# Patient Record
Sex: MALE | Race: WHITE | HISPANIC OR LATINO | Employment: FULL TIME | URBAN - METROPOLITAN AREA
[De-identification: names, ages, dates, MRNs, and addresses within clinical notes are randomized per-mention and may not be internally consistent; named-entity substitution may affect disease eponyms.]

---

## 2018-05-29 ENCOUNTER — ANESTHESIA - HEALTHEAST (OUTPATIENT)
Dept: SURGERY | Facility: CLINIC | Age: 43
End: 2018-05-29

## 2018-05-30 ENCOUNTER — SURGERY - HEALTHEAST (OUTPATIENT)
Dept: SURGERY | Facility: CLINIC | Age: 43
End: 2018-05-30

## 2018-05-30 ASSESSMENT — MIFFLIN-ST. JEOR: SCORE: 1931.35

## 2021-06-01 VITALS — WEIGHT: 193.2 LBS | HEIGHT: 78 IN | BODY MASS INDEX: 22.35 KG/M2

## 2021-06-18 NOTE — ANESTHESIA PREPROCEDURE EVALUATION
Anesthesia Evaluation      Patient summary reviewed   No history of anesthetic complications     Airway   Mallampati: II  Neck ROM: full   Pulmonary - normal exam   (+) a smoker                         Cardiovascular - negative ROS and normal exam   Neuro/Psych    (+) depression, anxiety/panic attacks,     Comments: EtoH, marijuana, tobacco use    Endo/Other - negative ROS      GI/Hepatic/Renal - negative ROS      Other findings: Last drink last evening      Dental - normal exam                        Anesthesia Plan  Planned anesthetic: general endotracheal  - 2-4mg versed  - likely will require higher doses of anesthesia than usual  - glidescope available  ASA 3   Induction: intravenous   Anesthetic plan and risks discussed with: patient  Anesthesia plan special considerations: dexmedetomidine  Post-op plan: routine recovery

## 2021-06-18 NOTE — ANESTHESIA CARE TRANSFER NOTE
Last vitals:   Vitals:    05/30/18 0902   BP: (!) 153/92   Pulse: 68   Resp: 14   Temp: 36.6  C (97.8  F)   SpO2: 100%     Patient's level of consciousness is drowsy  Spontaneous respirations: yes  Maintains airway independently: yes  Dentition unchanged: yes  Oropharynx: oropharynx clear of all foreign objects    QCDR Measures:  ASA# 20 - Surgical Safety Checklist: WHO surgical safety checklist completed prior to induction  PQRS# 430 - Adult PONV Prevention: 4558F - Pt received => 2 anti-emetic agents (different classes) preop & intraop  ASA# 8 - Peds PONV Prevention: NA - Not pediatric patient, not GA or 2 or more risk factors NOT present  PQRS# 424 - Raven-op Temp Management: 4559F - At least one body temp DOCUMENTED => 35.5C or 95.9F within required timeframe  PQRS# 426 - PACU Transfer Protocol: - Transfer of care checklist used  ASA# 14 - Acute Post-op Pain: ASA14B - Patient did NOT experience pain >= 7 out of 10

## 2021-06-18 NOTE — ANESTHESIA POSTPROCEDURE EVALUATION
"Patient: Mihir Palomares  LOWER LIP SUTURE REMOVAL BILATERAL SEPTOPLASTY, BILATERAL SUBMUCOSAL RESECTION OF TURBINATES, BILATERAL CAUTERY OF TURBINATES,,  COLSED REDUCTION OF NASAL FRACTURE  Anesthesia type: general    Patient location: Phase II Recovery  Last vitals:   Vitals:    05/30/18 1300   BP: (!) 169/92   Pulse: 61   Resp:    Temp: 36.6  C (97.8  F)   SpO2: 98%     Post vital signs: stable  Level of consciousness: awake and responds to simple questions  Post-anesthesia pain: pain controlled  Post-anesthesia nausea and vomiting: no  Pulmonary: unassisted  Cardiovascular: stable  Hydration: adequate  Anesthetic events: no    QCDR Measures:  ASA# 11 - Raven-op Cardiac Arrest: ASA11B - Patient did NOT experience unanticipated cardiac arrest  ASA# 12 - Raven-op Mortality Rate: ASA12B - Patient did NOT die  ASA# 13 - PACU Re-Intubation Rate: ASA13B - Patient did NOT require a new airway mgmt  ASA# 10 - Composite Anes Safety: ASA10A - No serious adverse event    Additional Notes: Called by Phase II RN re: patient with significant nose pain. Ordered 0.5mg dilaudid and asked RN to inform surgeon. Called again 2 hours later, patient continues to have pain. Came to phase II to evaluate. Patient reports having 4-5/10 (tolerable) pain. Mentions that he has a stabbing pain on superior surface of the back nasal passage (which is something new) every time he swallows - \"it feels like a stabbing pain and something rubbing against another.\" Is anxious to swallow d/t pain. Both myself and Dr. Ronald Pierre spoke with the surgeon regarding this new stabbing pain and concern for 1. Pain getting out of control at home d/t patient not taking po pain meds, 2. MADELYN/dehydration d/t patient not drinking/eating, and 3. Possible return to ED later. Dr. Rooney requested he be sent home on oral pain meds (and not receive any addn IV pain meds), not be admitted, and to inform the patient that this is normal and may worsen over the next 24 " hours before improving. She recommended that patient follow-up at her clinic with any concerns. Discussed plan with patient, which patient agrees to.

## 2024-06-27 ENCOUNTER — HOSPITAL ENCOUNTER (EMERGENCY)
Facility: HOSPITAL | Age: 49
Discharge: HOME | End: 2024-06-28
Attending: EMERGENCY MEDICINE | Admitting: EMERGENCY MEDICINE
Payer: COMMERCIAL

## 2024-06-27 DIAGNOSIS — R10.9 LEFT FLANK PAIN: ICD-10-CM

## 2024-06-27 PROBLEM — F10.90 ALCOHOL USE DISORDER: Status: ACTIVE | Noted: 2018-05-25

## 2024-06-27 PROBLEM — F12.10 TETRAHYDROCANNABINOL (THC) USE DISORDER, MILD, ABUSE: Status: ACTIVE | Noted: 2018-05-25

## 2024-06-27 PROBLEM — F17.200 TOBACCO USE DISORDER: Status: ACTIVE | Noted: 2018-05-25

## 2024-06-27 LAB
ALBUMIN SERPL BCG-MCNC: 4.4 G/DL (ref 3.5–5.2)
ALBUMIN UR-MCNC: NEGATIVE MG/DL
ALP SERPL-CCNC: 94 U/L (ref 40–150)
ALT SERPL W P-5'-P-CCNC: 31 U/L (ref 0–70)
ANION GAP SERPL CALCULATED.3IONS-SCNC: 12 MMOL/L (ref 7–15)
APPEARANCE UR: CLEAR
AST SERPL W P-5'-P-CCNC: 31 U/L (ref 0–45)
BASOPHILS # BLD AUTO: 0 10E3/UL (ref 0–0.2)
BASOPHILS NFR BLD AUTO: 0 %
BILIRUB SERPL-MCNC: 0.3 MG/DL
BILIRUB UR QL STRIP: NEGATIVE
BUN SERPL-MCNC: 17.1 MG/DL (ref 6–20)
CALCIUM SERPL-MCNC: 9.1 MG/DL (ref 8.6–10)
CHLORIDE SERPL-SCNC: 105 MMOL/L (ref 98–107)
COLOR UR AUTO: YELLOW
CREAT SERPL-MCNC: 1.04 MG/DL (ref 0.67–1.17)
DEPRECATED HCO3 PLAS-SCNC: 25 MMOL/L (ref 22–29)
EGFRCR SERPLBLD CKD-EPI 2021: 89 ML/MIN/1.73M2
EOSINOPHIL # BLD AUTO: 0.1 10E3/UL (ref 0–0.7)
EOSINOPHIL NFR BLD AUTO: 2 %
ERYTHROCYTE [DISTWIDTH] IN BLOOD BY AUTOMATED COUNT: 13.2 % (ref 10–15)
GLUCOSE SERPL-MCNC: 94 MG/DL (ref 70–99)
GLUCOSE UR STRIP-MCNC: NEGATIVE MG/DL
HCT VFR BLD AUTO: 42.6 % (ref 40–53)
HGB BLD-MCNC: 14.1 G/DL (ref 13.3–17.7)
HGB UR QL STRIP: NEGATIVE
HOLD SPECIMEN: NORMAL
HOLD SPECIMEN: NORMAL
IMM GRANULOCYTES # BLD: 0 10E3/UL
IMM GRANULOCYTES NFR BLD: 0 %
KETONES UR STRIP-MCNC: ABNORMAL MG/DL
LEUKOCYTE ESTERASE UR QL STRIP: NEGATIVE
LIPASE SERPL-CCNC: 21 U/L (ref 13–60)
LYMPHOCYTES # BLD AUTO: 1.4 10E3/UL (ref 0.8–5.3)
LYMPHOCYTES NFR BLD AUTO: 19 %
MAGNESIUM SERPL-MCNC: 2.3 MG/DL (ref 1.7–2.3)
MCH RBC QN AUTO: 30.5 PG (ref 26.5–33)
MCHC RBC AUTO-ENTMCNC: 33.1 G/DL (ref 31.5–36.5)
MCV RBC AUTO: 92 FL (ref 78–100)
MONOCYTES # BLD AUTO: 0.6 10E3/UL (ref 0–1.3)
MONOCYTES NFR BLD AUTO: 8 %
MUCOUS THREADS #/AREA URNS LPF: PRESENT /LPF
NEUTROPHILS # BLD AUTO: 5.1 10E3/UL (ref 1.6–8.3)
NEUTROPHILS NFR BLD AUTO: 71 %
NITRATE UR QL: NEGATIVE
NRBC # BLD AUTO: 0 10E3/UL
NRBC BLD AUTO-RTO: 0 /100
PH UR STRIP: 5.5 [PH] (ref 5–7)
PLATELET # BLD AUTO: 313 10E3/UL (ref 150–450)
POTASSIUM SERPL-SCNC: 4.7 MMOL/L (ref 3.4–5.3)
PROT SERPL-MCNC: 6.8 G/DL (ref 6.4–8.3)
RBC # BLD AUTO: 4.63 10E6/UL (ref 4.4–5.9)
RBC URINE: 1 /HPF
SODIUM SERPL-SCNC: 142 MMOL/L (ref 135–145)
SP GR UR STRIP: 1.03 (ref 1–1.03)
UROBILINOGEN UR STRIP-MCNC: <2 MG/DL
WBC # BLD AUTO: 7.2 10E3/UL (ref 4–11)
WBC URINE: 0 /HPF

## 2024-06-27 PROCEDURE — 83735 ASSAY OF MAGNESIUM: CPT

## 2024-06-27 PROCEDURE — 80053 COMPREHEN METABOLIC PANEL: CPT | Performed by: EMERGENCY MEDICINE

## 2024-06-27 PROCEDURE — 81001 URINALYSIS AUTO W/SCOPE: CPT | Performed by: EMERGENCY MEDICINE

## 2024-06-27 PROCEDURE — 99285 EMERGENCY DEPT VISIT HI MDM: CPT | Mod: 25

## 2024-06-27 PROCEDURE — 83690 ASSAY OF LIPASE: CPT | Performed by: EMERGENCY MEDICINE

## 2024-06-27 PROCEDURE — 36415 COLL VENOUS BLD VENIPUNCTURE: CPT | Performed by: EMERGENCY MEDICINE

## 2024-06-27 PROCEDURE — 85025 COMPLETE CBC W/AUTO DIFF WBC: CPT | Performed by: EMERGENCY MEDICINE

## 2024-06-27 ASSESSMENT — COLUMBIA-SUICIDE SEVERITY RATING SCALE - C-SSRS
6. HAVE YOU EVER DONE ANYTHING, STARTED TO DO ANYTHING, OR PREPARED TO DO ANYTHING TO END YOUR LIFE?: NO
2. HAVE YOU ACTUALLY HAD ANY THOUGHTS OF KILLING YOURSELF IN THE PAST MONTH?: NO
1. IN THE PAST MONTH, HAVE YOU WISHED YOU WERE DEAD OR WISHED YOU COULD GO TO SLEEP AND NOT WAKE UP?: NO

## 2024-06-27 ASSESSMENT — ACTIVITIES OF DAILY LIVING (ADL): ADLS_ACUITY_SCORE: 35

## 2024-06-27 NOTE — Clinical Note
Mihir Palomares was seen and treated in our emergency department on 6/27/2024.  He may return to work on 06/29/2024.       If you have any questions or concerns, please don't hesitate to call.      Sita De Souza PA-C

## 2024-06-28 ENCOUNTER — APPOINTMENT (OUTPATIENT)
Dept: CT IMAGING | Facility: HOSPITAL | Age: 49
End: 2024-06-28
Payer: COMMERCIAL

## 2024-06-28 VITALS
WEIGHT: 189.7 LBS | RESPIRATION RATE: 18 BRPM | DIASTOLIC BLOOD PRESSURE: 72 MMHG | BODY MASS INDEX: 20.84 KG/M2 | SYSTOLIC BLOOD PRESSURE: 121 MMHG | HEART RATE: 61 BPM | OXYGEN SATURATION: 96 % | TEMPERATURE: 97.8 F

## 2024-06-28 PROCEDURE — 74177 CT ABD & PELVIS W/CONTRAST: CPT

## 2024-06-28 PROCEDURE — 250N000011 HC RX IP 250 OP 636: Performed by: EMERGENCY MEDICINE

## 2024-06-28 RX ORDER — IOPAMIDOL 755 MG/ML
80 INJECTION, SOLUTION INTRAVASCULAR ONCE
Status: COMPLETED | OUTPATIENT
Start: 2024-06-28 | End: 2024-06-28

## 2024-06-28 RX ADMIN — IOPAMIDOL 80 ML: 755 INJECTION, SOLUTION INTRAVENOUS at 00:17

## 2024-06-28 ASSESSMENT — ACTIVITIES OF DAILY LIVING (ADL): ADLS_ACUITY_SCORE: 35

## 2024-06-28 NOTE — ED PROVIDER NOTES
EMERGENCY DEPARTMENT ENCOUNTER      NAME: Mihir Palomares  AGE: 49 year old male  YOB: 1975  MRN: [id number]  EVALUATION DATE & TIME: 06/27/24 10:11 PM    PCP: [facility]    ED PROVIDER: Sita De Souza PA-C      CHIEF COMPLAINT:  Flank Pain and Abdominal Pain      FINAL IMPRESSION:  1. Left flank pain          ED COURSE & MEDICAL DECISION MAKING:  Pertinent Labs & Imaging studies reviewed. (See chart for details)    The patient is a 49 year old-year-old male with a history of alcohol, tobacco, THC use presenting to the emergency department for evaluation of left flank pain for the past few months onset after starting to lift weights.  No fevers.  No chest pain, shortness of breath, nausea, vomiting, diarrhea, urinary symptoms, STI concerns.  His pain was initially improved for the past 2 days, worsened today and was refractory to tobacco and THC use.     Initial vital signs reviewed and significant for elevated blood pressure.  Patient is afebrile with no antipyretic medications taken thus far today.  Reviewed with measurement and within normal limits.  On exam, the patient is well-appearing and nontoxic-appearing resting in no acute distress.  He has no midline cervical, thoracic, lumbar tenderness, crepitus, step-offs, or deformities.  Strength, sensation, patellar and Achilles DTRs intact and symmetric bilateral lower extremities. Distal pulses present and symmetric all 4 extremities. No CVA tenderness.No abdominal tenderness, rebounding, or guarding.  No McBurney point tenderness.    Differential diagnosis includes musculoskeletal pain, hernia, pancreatitis, hepatobiliary disease, ureterolithiasis, pyelonephritis, cystitis, small bowel obstruction/ileus, GERD, , gastroenteritis, appendicitis, diverticulitis. Low clinical suspicion for perforation (no rebound or guarding), AAA (hemodynamically stable months since initial symptom onset), mesenteric ischemia (no pain out of  proportion).  No nausea, vomiting, or diarrhea to suggest gastritis, gastroenteritis.  No epigastric pain, nausea to suggest PUD. I have lower suspicion for symptoms secondary to cardiopulmonary or vascular process given history and exam.     CBC reassuring with no leukocytosis.  Hemoglobin stable at 14.1.  CMP without significant electrolyte abnormality or severe MADELYN.  Lipase within normal limits, low clinical suspicion for pancreatitis.  Urinalysis not suggestive of infection, low clinical suspicion for cystitis, pyelonephritis.  There is no midline spinal pain and patient is neurologically intact without signs or symptoms of cauda equina making spinal pathology unlikely.  CT abdomen pelvis with contrast obtained which revealed no evidence for etiology of patient's left flank pain, specifically no obstructive ureterolithiasis or bladder stone, hydronephrosis, hernia.    Offered analgesic and antiemetic medications, patient declined. Overall, patient history, exam, and workup here most consistent with left flank pain, unsure of exact etiology at this time, possibly musculoskeletal in nature. Patient has reassuring workup here and I do not feel that further workup is indicated on an emergent basis at this time.    Discussed plan for discharge home with close outpatient follow-up with primary care physician within the next 48 to 72 hours for close recheck. The patient verbalized understanding and is comfortable with this plan. Symptomatic home cares discussed. Emphasized importance of follow up with primary care clinician. Strict return precautions discussed.     At the conclusion of the encounter I discussed the results of all of the tests and the disposition. The questions were answered. The patient or family acknowledged understanding and was agreeable with the care plan.       ED COURSE:  10:19 PM I met and introduced myself to the patient. I gathered initial history and performed an initial physical exam. We  discussed options and plan for diagnostics and treatment here in the ED.  10:32 PM I have staffed the patient with Dr. Yee, ED physician, who has evaluated the patient and agrees with all aspects of today's care.   1:00 AM I discussed the plan for discharge with the patient, and patient is agreeable. We discussed supportive cares at home and reasons for return to the ER including new or worsening symptoms - all questions and concerns addressed to the best of my ability. Strict return precautions discussed. Patient to be discharged by RN.      Medical Decision Making  Obtained supplemental history:Supplemental history obtained?: No  Reviewed external records: External records reviewed?: No  Care impacted by chronic illness:N/A  Care significantly affected by social determinants of health:N/A  Did you consider but not order tests?: Work up considered but not performed and documented in chart, if applicable  Did you interpret images independently?: Independent interpretation of ECG and images noted in documentation, when applicable.  Consultation discussion with other provider:Did you involve another provider (consultant, MH, pharmacy, etc.)?: No  Discharge. No recommendations on prescription strength medication(s). See documentation for any additional details.      CRITICAL CARE:  Not applicable      MEDICATIONS GIVEN IN THE EMERGENCY:  Medications   iopamidol (ISOVUE-370) solution 80 mL (80 mLs Intravenous $Given 6/28/24 0017)       NEW PRESCRIPTIONS STARTED AT TODAY'S ER VISIT  New Prescriptions    No medications on file          =================================================================    HPI    Patient information was obtained from: patient    Use of Intrepreter: N/A     Mihir Palomares is a 49 year old male with no pertinent medical history who presents to the emergency department for evaluation of flank and abdominal pain.    Per patient, a few months ago he began experiencing pain on his left flank  "region by his left upper quadrant onset after he started lifting weights. The pain started clearing up a few days ago but came back and was much worse today and he said \"it felt like my rib cage was poking out.\" Along with that, the pain has also wrapped around to his back.      He has no chest pain, shortness of breath, diarrhea, nausea, vomiting, STI concerns, painful urination, blood in urine, fever, or chills.    No one in his household has been experiencing these symptoms and he has had no recent travel. He quit drinking on 2024 but before that point he had been drinking around a pint of vodka a day. He also noted smoking marijuana today to try to relieve the pain which didn't help, and he is a regular marijuana user.  He smokes tobacco and infrequently, did smoke today with no alleviation in his symptoms. In the past he did have a kidney stone that passed on its own, is unsure if his current symptoms feel similar. He takes vitamins but no daily medications.  No over-the-counter analgesic medication prior to arrival today.       PAST MEDICAL HISTORY:  No past medical history on file.      PAST SURGICAL HISTORY:  Past Surgical History:   Procedure Laterality Date    HC CLOSED TX NASAL BONE FRACTURE W/ STABILIZATION N/A 2018    Procedure:  COLSED REDUCTION OF NASAL FRACTURE;  Surgeon: Nathaniel Rooney MD;  Location: [facility];  Service: ENT    HC REPAIR OF NASAL SEPTUM N/A 2018    Procedure: LOWER LIP SUTURE REMOVAL BILATERAL SEPTOPLASTY, BILATERAL SUBMUCOSAL RESECTION OF TURBINATES, BILATERAL CAUTERY OF TURBINATES,;  Surgeon: Nathaniel Rooney MD;  Location: [facility];  Service: ENT    WISDOM TOOTH EXTRACTION         CURRENT MEDICATIONS:    Prior to Admission Medications   Prescriptions Last Dose Informant Patient Reported? Taking?   KEFLEX 500 MG OR CAPS   No No   Si caps trid x 5 days      Facility-Administered Medications: None       ALLERGIES:  No Known " Allergies    FAMILY HISTORY:  No family history on file.    SOCIAL HISTORY:  Social History     Tobacco Use    Smoking status: Every Day     Current packs/day: 0.25     Types: Cigarettes    Smokeless tobacco: Never   Substance Use Topics    Alcohol use: Yes     Comment: Alcoholic Drinks/day: 1/2 pint vodka daily        VITALS:    First Vitals:  Patient Vitals for the past 24 hrs:   BP Temp Temp src Pulse Resp SpO2 Weight   06/28/24 0042 -- -- -- 61 -- 96 % --   06/28/24 0036 121/72 -- -- 61 -- 97 % --   06/27/24 2318 119/76 -- -- 61 -- 99 % --   06/27/24 2300 -- -- -- 61 -- 99 % --   06/27/24 2245 112/63 -- -- 61 -- 100 % --   06/27/24 2234 117/71 -- -- -- -- -- --   06/27/24 2038 (!) 146/94 97.8  F (36.6  C) Oral 85 18 100 % 86 kg (189 lb 11.2 oz)       Patient Vitals for the past 24 hrs:   BP Temp Temp src Pulse Resp SpO2 Weight   06/28/24 0042 -- -- -- 61 -- 96 % --   06/28/24 0036 121/72 -- -- 61 -- 97 % --   06/27/24 2318 119/76 -- -- 61 -- 99 % --   06/27/24 2300 -- -- -- 61 -- 99 % --   06/27/24 2245 112/63 -- -- 61 -- 100 % --   06/27/24 2234 117/71 -- -- -- -- -- --   06/27/24 2038 (!) 146/94 97.8  F (36.6  C) Oral 85 18 100 % 86 kg (189 lb 11.2 oz)         PHYSICAL EXAM  VITAL SIGNS: /72   Pulse 61   Temp 97.8  F (36.6  C) (Oral)   Resp 18   Wt 86 kg (189 lb 11.2 oz)   SpO2 96%   BMI 20.84 kg/m     GENERAL: Awake, alert, answering questions appropriately, in no acute distress.  HEENT: Moist mucous membranes. Posterior oropharynx clear with no erythema, no exudate. No tonsillar hypertrophy. Uvula midline. Tolerating secretions, no drooling.    SPEECH:  Easy to understand speech, Normal volume and dar. Normal phonation.  PULMONARY: No respiratory distress, Breathing comfortably on room air. Lungs clear to auscultation bilaterally.  CARDIOVASCULAR: Regular rate and rhythm, radial, dorsalis pedis, posterior tibialis pulses present, symmetric, and normal.  CHEST: No chest wall tenderness to  palpation.  No crepitus.  No flail chest.  ABDOMINAL: Soft, Nondistended, Nontender, No rebound or guarding, No palpable masses.  BACK: No midline cervical, thoracic, lumbar tenderness to palpation.  No tenderness to palpation throughout low back musculature.  No CVA tenderness.  EXTREMITIES: Extremities are warm and well perfused. No lower extremity edema.  NEUROLOGIC: GCS 15.  Strength, sensation intact bilateral lower extremities.  Moving all extremities spontaneously.  Patellar and Achilles DTRs intact and symmetric.  SKIN: Exposed areas of skin warm, dry, no rashes.  PSYCH: Normal mood and affect.           RADIOLOGY/LAB:  Reviewed all pertinent imaging. Please see official radiology report.  All pertinent labs reviewed and interpreted.  Results for orders placed or performed during the hospital encounter of 06/27/24   CT Abdomen Pelvis w Contrast    Impression    IMPRESSION:   1.  No evidence for an etiology of the patient's left flank pain. No evidence for obstructing ureteral or bladder calculi. No urinary collecting system dilatation.    2.  No evidence for ventral abdominal wall hernia. No evidence for inguinal, femoral or obturator hernias.    3.  Minimal degenerative disc changes in the spine.   UA with Microscopic reflex to Culture    Specimen: Urine, Midstream   Result Value Ref Range    Color Urine Yellow Colorless, Straw, Light Yellow, Yellow    Appearance Urine Clear Clear    Glucose Urine Negative Negative mg/dL    Bilirubin Urine Negative Negative    Ketones Urine Trace (A) Negative mg/dL    Specific Gravity Urine 1.035 (H) 1.001 - 1.030    Blood Urine Negative Negative    pH Urine 5.5 5.0 - 7.0    Protein Albumin Urine Negative Negative mg/dL    Urobilinogen Urine <2.0 <2.0 mg/dL    Nitrite Urine Negative Negative    Leukocyte Esterase Urine Negative Negative    Mucus Urine Present (A) None Seen /LPF    RBC Urine 1 <=2 /HPF    WBC Urine 0 <=5 /HPF   Comprehensive metabolic panel   Result Value  Ref Range    Sodium 142 135 - 145 mmol/L    Potassium 4.7 3.4 - 5.3 mmol/L    Carbon Dioxide (CO2) 25 22 - 29 mmol/L    Anion Gap 12 7 - 15 mmol/L    Urea Nitrogen 17.1 6.0 - 20.0 mg/dL    Creatinine 1.04 0.67 - 1.17 mg/dL    GFR Estimate 89 >60 mL/min/1.73m2    Calcium 9.1 8.6 - 10.0 mg/dL    Chloride 105 98 - 107 mmol/L    Glucose 94 70 - 99 mg/dL    Alkaline Phosphatase 94 40 - 150 U/L    AST 31 0 - 45 U/L    ALT 31 0 - 70 U/L    Protein Total 6.8 6.4 - 8.3 g/dL    Albumin 4.4 3.5 - 5.2 g/dL    Bilirubin Total 0.3 <=1.2 mg/dL   Result Value Ref Range    Lipase 21 13 - 60 U/L   Extra Blue Top Tube   Result Value Ref Range    Hold Specimen JIC    Extra Red Top Tube   Result Value Ref Range    Hold Specimen JIC    CBC with platelets and differential   Result Value Ref Range    WBC Count 7.2 4.0 - 11.0 10e3/uL    RBC Count 4.63 4.40 - 5.90 10e6/uL    Hemoglobin 14.1 13.3 - 17.7 g/dL    Hematocrit 42.6 40.0 - 53.0 %    MCV 92 78 - 100 fL    MCH 30.5 26.5 - 33.0 pg    MCHC 33.1 31.5 - 36.5 g/dL    RDW 13.2 10.0 - 15.0 %    Platelet Count 313 150 - 450 10e3/uL    % Neutrophils 71 %    % Lymphocytes 19 %    % Monocytes 8 %    % Eosinophils 2 %    % Basophils 0 %    % Immature Granulocytes 0 %    NRBCs per 100 WBC 0 <1 /100    Absolute Neutrophils 5.1 1.6 - 8.3 10e3/uL    Absolute Lymphocytes 1.4 0.8 - 5.3 10e3/uL    Absolute Monocytes 0.6 0.0 - 1.3 10e3/uL    Absolute Eosinophils 0.1 0.0 - 0.7 10e3/uL    Absolute Basophils 0.0 0.0 - 0.2 10e3/uL    Absolute Immature Granulocytes 0.0 <=0.4 10e3/uL    Absolute NRBCs 0.0 10e3/uL   Result Value Ref Range    Magnesium 2.3 1.7 - 2.3 mg/dL         EKG:  None      PROCEDURES:  None        I, Chito Ramirez, am serving as a scribe to document services personally performed by Sita De Souza PA-C, based on my observation and the provider's statements to me. I, Sita De Souza PA-C attest that Chito Ramirez is acting in a scribe capacity, has observed my performance of the  services and has documented them in accordance with my direction.         Sita De oSuza PA-C  Emergency Medicine  [facility] EMERGENCY DEPARTMENT  [address] [place] [phone]  Dept: [phone]      Sita De Souza PA-C  06/28/24 0127

## 2024-06-28 NOTE — ED PROVIDER NOTES
IAnabella have reviewed the documentation, personally taken the patient's history, performed an exam and agree with the physical finds, diagnosis and management plan.    HPI:  50 y/o m hx of etoh use until 6/18 when he stopped, daily marijuana use.  Here with complaint of months of left upper quadrant abdominal pain worse after lifting weights.  Feels some sense that it is bulging.  Was seen previously, per chart review he had a x-ray of the chest, to evaluate for rib fractures that was negative.  It had gotten better several days ago then recurred today.  He is scheduled for an outpatient CT scan of the abdomen to evaluate for hernia but did not want to wait so came to the ER.  No n/v, diarrhea, no fever/chills  No prev abd surg  Hx of renal stones     Physical Exam: No tenderness to palpation along the chest wall or lower rib margin.  Abdomen is soft, and I am not able to reproduce any tenderness.  I do not feel any palpable hernia.    MDM: Hernia, rectus diastases versus some laxity at the costochondral margin with some movement of the rib cage on lifting    ED Course/workup: Laboratory workup and urine unremarkable.  CT abdomen pelvis unremarkable.  Review of reassured patient have follow-up with PCP.      ED Course as of 06/28/24 0054   Thu Jun 27, 2024   5671 EXAM: RADIOGRAPHS OF THE CHEST AND LEFT-SIDED RIBS    CLINICAL INFORMATION: The patient is a 49-year-old with left-sided chest pain and swelling.    TECHNICAL INFORMATION: A frontal radiograph of the chest was performed in addition to oblique radiographs of the lower left-sided ribs. A total of 3 images were produced.    INTERPRETATION: No evidence for fracture or acute bony abnormality can be seen involving the left-sided ribs. The right-sided ribs are within normal limits. No definite chest wall abnormalities are seen.    No intrathoracic abnormalities of the left or right hemithorax can be seen. There is no evidence for pulmonary edema or  pulmonary infiltrate. No pleural effusions are seen. The cardiomediastinal structures are within normal limits.    CONCLUSION:    1. No definite evidence for left-sided rib abnormality can be seen. The left-sided chest wall structures are within normal limits.    2. No intrathoracic abnormalities are identified.   Fri Jun 28, 2024   0033 CT Abdomen Pelvis w Contrast  CT independently interpreted by myself without visualized hernia       Final Diagnosis:   No diagnosis found.    I personally saw the patient and performed a substantive portion of the visit including all aspects of the medical decision making.  I personally made/approved the management plan and take responsibility for the patient management.     MD Joselito Wise Zabrina N, MD  06/28/24 0054

## 2024-06-28 NOTE — DISCHARGE INSTRUCTIONS
You were seen in the emergency department today for your symptoms.      Please follow-up with your primary care clinician within the next 48 to 72 hours for close recheck.      Please return to the emergency department if you develop any new, worsening, or concerning symptoms including but not limited to the following:     Call your doctor now or seek immediate medical care if:    Your flank pain gets worse.     You have new symptoms, such as fever, nausea, or vomiting.     You have symptoms of a urinary problem. For example:  You have blood or pus in your urine.  You have chills or body aches.  It hurts to urinate.  You have groin or belly pain.   Watch closely for changes in your health, and be sure to contact your doctor if you do not get better as expected.

## 2024-06-28 NOTE — ED TRIAGE NOTES
Pt arrives c/o left side pain. Pt reports it starts in his RUQ and radiates around to his flank. Pt reports a history of the left side of his rib cage sticking out.      Triage Assessment (Adult)       Row Name 06/27/24 2039          Triage Assessment    Airway WDL WDL        Respiratory WDL    Respiratory WDL WDL        Skin Circulation/Temperature WDL    Skin Circulation/Temperature WDL WDL        Cardiac WDL    Cardiac WDL WDL        Peripheral/Neurovascular WDL    Peripheral Neurovascular WDL WDL        Cognitive/Neuro/Behavioral WDL    Cognitive/Neuro/Behavioral WDL WDL

## 2025-08-24 ENCOUNTER — HOSPITAL ENCOUNTER (EMERGENCY)
Facility: HOSPITAL | Age: 50
Discharge: HOME | End: 2025-08-24
Attending: STUDENT IN AN ORGANIZED HEALTH CARE EDUCATION/TRAINING PROGRAM | Admitting: STUDENT IN AN ORGANIZED HEALTH CARE EDUCATION/TRAINING PROGRAM
Payer: COMMERCIAL

## 2025-08-24 VITALS
HEART RATE: 60 BPM | RESPIRATION RATE: 16 BRPM | HEIGHT: 78 IN | BODY MASS INDEX: 22.68 KG/M2 | SYSTOLIC BLOOD PRESSURE: 132 MMHG | DIASTOLIC BLOOD PRESSURE: 72 MMHG | OXYGEN SATURATION: 100 % | TEMPERATURE: 97 F | WEIGHT: 196 LBS

## 2025-08-24 DIAGNOSIS — S05.01XA ABRASION OF RIGHT CORNEA, INITIAL ENCOUNTER: Primary | ICD-10-CM

## 2025-08-24 PROCEDURE — 250N000013 HC RX MED GY IP 250 OP 250 PS 637: Performed by: STUDENT IN AN ORGANIZED HEALTH CARE EDUCATION/TRAINING PROGRAM

## 2025-08-24 PROCEDURE — 250N000009 HC RX 250: Performed by: STUDENT IN AN ORGANIZED HEALTH CARE EDUCATION/TRAINING PROGRAM

## 2025-08-24 PROCEDURE — 99283 EMERGENCY DEPT VISIT LOW MDM: CPT | Performed by: STUDENT IN AN ORGANIZED HEALTH CARE EDUCATION/TRAINING PROGRAM

## 2025-08-24 RX ORDER — TETRACAINE HYDROCHLORIDE 5 MG/ML
1-2 SOLUTION OPHTHALMIC ONCE
Status: COMPLETED | OUTPATIENT
Start: 2025-08-24 | End: 2025-08-24

## 2025-08-24 RX ORDER — IBUPROFEN 600 MG/1
600 TABLET, FILM COATED ORAL ONCE
Status: COMPLETED | OUTPATIENT
Start: 2025-08-24 | End: 2025-08-24

## 2025-08-24 RX ORDER — FLUORESCEIN SODIUM 1 MG/MG
1 STRIP OPHTHALMIC ONCE
Status: COMPLETED | OUTPATIENT
Start: 2025-08-24 | End: 2025-08-24

## 2025-08-24 RX ORDER — POLYMYXIN B SULFATE AND TRIMETHOPRIM 1; 10000 MG/ML; [USP'U]/ML
1-2 SOLUTION OPHTHALMIC EVERY 4 HOURS
Qty: 10 ML | Refills: 0 | Status: SHIPPED | OUTPATIENT
Start: 2025-08-24 | End: 2025-08-29

## 2025-08-24 RX ADMIN — TETRACAINE HYDROCHLORIDE 1 DROP: 5 SOLUTION OPHTHALMIC at 03:46

## 2025-08-24 RX ADMIN — IBUPROFEN 600 MG: 600 TABLET ORAL at 03:44

## 2025-08-24 RX ADMIN — FLUORESCEIN SODIUM 1 STRIP: 1 STRIP OPHTHALMIC at 03:46

## 2025-08-24 ASSESSMENT — COLUMBIA-SUICIDE SEVERITY RATING SCALE - C-SSRS
6. HAVE YOU EVER DONE ANYTHING, STARTED TO DO ANYTHING, OR PREPARED TO DO ANYTHING TO END YOUR LIFE?: NO
1. IN THE PAST MONTH, HAVE YOU WISHED YOU WERE DEAD OR WISHED YOU COULD GO TO SLEEP AND NOT WAKE UP?: NO
2. HAVE YOU ACTUALLY HAD ANY THOUGHTS OF KILLING YOURSELF IN THE PAST MONTH?: NO